# Patient Record
Sex: MALE | Race: BLACK OR AFRICAN AMERICAN | Employment: UNEMPLOYED | URBAN - METROPOLITAN AREA
[De-identification: names, ages, dates, MRNs, and addresses within clinical notes are randomized per-mention and may not be internally consistent; named-entity substitution may affect disease eponyms.]

---

## 2021-05-06 ENCOUNTER — HOSPITAL ENCOUNTER (EMERGENCY)
Age: 21
Discharge: HOME | End: 2021-05-06
Attending: EMERGENCY MEDICINE
Payer: COMMERCIAL

## 2021-05-06 ENCOUNTER — APPOINTMENT (OUTPATIENT)
Dept: GENERAL RADIOLOGY | Age: 21
End: 2021-05-06
Payer: COMMERCIAL

## 2021-05-06 ENCOUNTER — APPOINTMENT (OUTPATIENT)
Dept: CT IMAGING | Age: 21
End: 2021-05-06
Payer: COMMERCIAL

## 2021-05-06 VITALS
OXYGEN SATURATION: 99 % | WEIGHT: 280 LBS | RESPIRATION RATE: 26 BRPM | TEMPERATURE: 98.2 F | HEART RATE: 82 BPM | SYSTOLIC BLOOD PRESSURE: 128 MMHG | DIASTOLIC BLOOD PRESSURE: 72 MMHG

## 2021-05-06 DIAGNOSIS — R94.31 EKG ABNORMALITIES: ICD-10-CM

## 2021-05-06 DIAGNOSIS — I51.7 CARDIOMEGALY: ICD-10-CM

## 2021-05-06 DIAGNOSIS — R06.09 DOE (DYSPNEA ON EXERTION): Primary | ICD-10-CM

## 2021-05-06 LAB
ABO/RH: NORMAL
ACETAMINOPHEN LEVEL: <5 MCG/ML (ref 10–30)
ALBUMIN SERPL-MCNC: 4.4 G/DL (ref 3.5–5.2)
ALP BLD-CCNC: 84 U/L (ref 40–129)
ALT SERPL-CCNC: 21 U/L (ref 0–40)
ANION GAP SERPL CALCULATED.3IONS-SCNC: 10 MMOL/L (ref 7–16)
ANTIBODY SCREEN: NORMAL
AST SERPL-CCNC: 16 U/L (ref 0–39)
BILIRUB SERPL-MCNC: 0.3 MG/DL (ref 0–1.2)
BUN BLDV-MCNC: 10 MG/DL (ref 6–20)
CALCIUM SERPL-MCNC: 9.5 MG/DL (ref 8.6–10.2)
CHLORIDE BLD-SCNC: 106 MMOL/L (ref 98–107)
CK MB: 2.1 NG/ML (ref 0–7.7)
CO2: 25 MMOL/L (ref 22–29)
CREAT SERPL-MCNC: 0.7 MG/DL (ref 0.7–1.2)
D DIMER: <200 NG/ML DDU
ETHANOL: <10 MG/DL (ref 0–0.08)
GFR AFRICAN AMERICAN: >60
GFR NON-AFRICAN AMERICAN: >60 ML/MIN/1.73
GLUCOSE BLD-MCNC: 104 MG/DL (ref 74–99)
HCT VFR BLD CALC: 45.2 % (ref 37–54)
HEMOGLOBIN: 15.1 G/DL (ref 12.5–16.5)
MCH RBC QN AUTO: 30.5 PG (ref 26–35)
MCHC RBC AUTO-ENTMCNC: 33.4 % (ref 32–34.5)
MCV RBC AUTO: 91.3 FL (ref 80–99.9)
PDW BLD-RTO: 11.5 FL (ref 11.5–15)
PLATELET # BLD: 246 E9/L (ref 130–450)
PMV BLD AUTO: 10.1 FL (ref 7–12)
POTASSIUM SERPL-SCNC: 3.8 MMOL/L (ref 3.5–5)
PRO-BNP: 9 PG/ML (ref 0–125)
RBC # BLD: 4.95 E12/L (ref 3.8–5.8)
SALICYLATE, SERUM: <0.3 MG/DL (ref 0–30)
SARS-COV-2, NAAT: NOT DETECTED
SODIUM BLD-SCNC: 141 MMOL/L (ref 132–146)
TOTAL CK: 154 U/L (ref 20–200)
TOTAL PROTEIN: 7.1 G/DL (ref 6.4–8.3)
TRICYCLIC ANTIDEPRESSANTS SCREEN SERUM: NEGATIVE NG/ML
TROPONIN: <0.01 NG/ML (ref 0–0.03)
WBC # BLD: 4.6 E9/L (ref 4.5–11.5)

## 2021-05-06 PROCEDURE — 6370000000 HC RX 637 (ALT 250 FOR IP): Performed by: EMERGENCY MEDICINE

## 2021-05-06 PROCEDURE — 99284 EMERGENCY DEPT VISIT MOD MDM: CPT

## 2021-05-06 PROCEDURE — 80143 DRUG ASSAY ACETAMINOPHEN: CPT

## 2021-05-06 PROCEDURE — 36415 COLL VENOUS BLD VENIPUNCTURE: CPT

## 2021-05-06 PROCEDURE — 71045 X-RAY EXAM CHEST 1 VIEW: CPT

## 2021-05-06 PROCEDURE — 2580000003 HC RX 258: Performed by: RADIOLOGY

## 2021-05-06 PROCEDURE — 85027 COMPLETE CBC AUTOMATED: CPT

## 2021-05-06 PROCEDURE — 87635 SARS-COV-2 COVID-19 AMP PRB: CPT

## 2021-05-06 PROCEDURE — 6360000004 HC RX CONTRAST MEDICATION: Performed by: RADIOLOGY

## 2021-05-06 PROCEDURE — 80307 DRUG TEST PRSMV CHEM ANLYZR: CPT

## 2021-05-06 PROCEDURE — 80053 COMPREHEN METABOLIC PANEL: CPT

## 2021-05-06 PROCEDURE — 80179 DRUG ASSAY SALICYLATE: CPT

## 2021-05-06 PROCEDURE — 84484 ASSAY OF TROPONIN QUANT: CPT

## 2021-05-06 PROCEDURE — 71275 CT ANGIOGRAPHY CHEST: CPT

## 2021-05-06 PROCEDURE — 86850 RBC ANTIBODY SCREEN: CPT

## 2021-05-06 PROCEDURE — 82550 ASSAY OF CK (CPK): CPT

## 2021-05-06 PROCEDURE — 85378 FIBRIN DEGRADE SEMIQUANT: CPT

## 2021-05-06 PROCEDURE — 86901 BLOOD TYPING SEROLOGIC RH(D): CPT

## 2021-05-06 PROCEDURE — 83880 ASSAY OF NATRIURETIC PEPTIDE: CPT

## 2021-05-06 PROCEDURE — 93005 ELECTROCARDIOGRAM TRACING: CPT | Performed by: NURSE PRACTITIONER

## 2021-05-06 PROCEDURE — 86900 BLOOD TYPING SEROLOGIC ABO: CPT

## 2021-05-06 PROCEDURE — 82553 CREATINE MB FRACTION: CPT

## 2021-05-06 PROCEDURE — 82077 ASSAY SPEC XCP UR&BREATH IA: CPT

## 2021-05-06 RX ORDER — ASPIRIN 81 MG/1
324 TABLET, CHEWABLE ORAL ONCE
Status: COMPLETED | OUTPATIENT
Start: 2021-05-06 | End: 2021-05-06

## 2021-05-06 RX ORDER — SODIUM CHLORIDE 0.9 % (FLUSH) 0.9 %
10 SYRINGE (ML) INJECTION
Status: COMPLETED | OUTPATIENT
Start: 2021-05-06 | End: 2021-05-06

## 2021-05-06 RX ADMIN — Medication 10 ML: at 18:01

## 2021-05-06 RX ADMIN — IOPAMIDOL 60 ML: 755 INJECTION, SOLUTION INTRAVENOUS at 18:00

## 2021-05-06 RX ADMIN — ASPIRIN 324 MG: 81 TABLET, CHEWABLE ORAL at 15:11

## 2021-05-06 ASSESSMENT — ENCOUNTER SYMPTOMS
VOMITING: 0
SORE THROAT: 0
RHINORRHEA: 0
NAUSEA: 0
COUGH: 1
ABDOMINAL PAIN: 0
SHORTNESS OF BREATH: 1
DIARRHEA: 0
BACK PAIN: 0

## 2021-05-06 NOTE — ED PROVIDER NOTES
[facility]  Department of Emergency Medicine     Written by: Jimbo Dominguez DO  Patient Name: Thompson Councilman  Attending Provider: No att. providers found  Admit Date: 2021  2:47 PM  MRN: [id number]                   : 2000        Chief Complaint   Patient presents with    Other     went for a check up becasue he has a murmur, they did an xr and has fluid around heart. dr told pt he might have had a heart attack. pt gabriela states hes had a cough and sob for months     - Chief complaint    HPI     Patient is a 67-year-old male with reported past medical history of LVH and childhood heart murmur who presents the ED from PCPs office due to concerning EKG changes. Patient states he made an outpatient ointment today because he has a history of heart murmur; apparently an outpatient chest x-ray today showed possible fluid around his heart. Patient only endorses symptoms of cough and shortness of breath for a few months; he adamantly denies any chest pain at this time. Complaint overall is moderate to severe in severity, no specific aggravating or alleviating factors, and began sometime prior to arrival. Denies any fevers, neck pain or stiffness, sore throat, chest pain or palpitations, abdominal pain, nausea or vomiting, diarrhea, black or bloody stools, urinary symptoms, numbness or tingling anywhere, lower extremity edema or tenderness. Review of Systems   Constitutional: Negative for chills and fever. HENT: Negative for rhinorrhea and sore throat. Eyes: Negative for visual disturbance. Respiratory: Positive for cough and shortness of breath. Cardiovascular: Negative for chest pain and palpitations. Gastrointestinal: Negative for abdominal pain, diarrhea, nausea and vomiting. Genitourinary: Negative for dysuria and frequency. Musculoskeletal: Negative for back pain, neck pain and neck stiffness. Skin: Negative for rash and wound.    Neurological: Negative for dizziness and headaches. Psychiatric/Behavioral: Negative for confusion. All other systems reviewed and are negative. Physical Exam  Vitals signs and nursing note reviewed. Constitutional:       General: He is not in acute distress. Appearance: He is obese. HENT:      Head: Normocephalic and atraumatic. Right Ear: External ear normal.      Left Ear: External ear normal.      Nose: Nose normal. No rhinorrhea. Mouth/Throat:      Mouth: Mucous membranes are moist.      Pharynx: Oropharynx is clear. Eyes:      Extraocular Movements: Extraocular movements intact. Conjunctiva/sclera: Conjunctivae normal.      Pupils: Pupils are equal, round, and reactive to light. Neck:      Musculoskeletal: Normal range of motion and neck supple. No neck rigidity or muscular tenderness. Cardiovascular:      Rate and Rhythm: Normal rate and regular rhythm. Pulses: Normal pulses. Heart sounds: Murmur present. Pulmonary:      Effort: Pulmonary effort is normal. No respiratory distress. Breath sounds: Normal breath sounds. No wheezing or rales. Abdominal:      General: Bowel sounds are normal.      Palpations: Abdomen is soft. Tenderness: There is no abdominal tenderness. There is no guarding. Musculoskeletal: Normal range of motion. General: No tenderness. Right lower leg: No edema. Left lower leg: No edema. Skin:     General: Skin is warm and dry. Capillary Refill: Capillary refill takes less than 2 seconds. Coloration: Skin is not jaundiced or pale. Findings: No bruising, erythema or rash. Neurological:      General: No focal deficit present. Mental Status: He is alert and oriented to person, place, and time. Psychiatric:         Mood and Affect: Mood normal.         Behavior: Behavior normal.                Procedures       MDM     25-year-old male presents due to concerning outpatient CXR and EKG changes.   Vitals stable on arrival, patient AAOx4 in no acute distress. Denies any chest pain. EKG showing ST elevations in I and aVL, as well as depressions in III and aVF; after discussion with ED attending and interventional cardiologist these changes are thought more likely to be combination of benign early repolarization and patient's left ventricular hypertrophy. Patient remained asymptomatic throughout this encounter. Bedside cardiac ultrasound did not appear to show evidence for any pericardial effusion; CTA chest shows no evidence of pulmonary embolism or acute pulmonary abnormality, does show cardiomegaly; heart and pericardium demonstrate no acute abnormality. Labs reviewed and generally unremarkable; specifically, D-dimer and troponin are negative; rapid Covid is also negative. Given these findings and patient's lack of symptoms, feel patient is appropriate for discharge with instructions for outpatient follow-up with [address] [place] [address] cardiology group as well as his PCP; discussed results and findings with the patient and his mother who is at bedside, they voiced understanding and their questions were answered. Return precautions were discussed. I have discussed this patient with my attending, who has seen the patient and agrees with this disposition. Patient was seen and evaluated by myself and my attending Alyson Izquierdo DO. Assessment and Plan discussed with attending provider, please see attestation for final plan of care. ED Course as of May 07 0043   Thu May 06, 2021   6465 3:05 PM - Spoke with Dr. Jacky Guerrier (Cardiology). Discussed case. They reviewed EKG. Patient currently chest pain-free. No Cath Lab activation at this time will continue work-up    [ME]   1606 Patient reassessed, he continues to remain chest pain free at this time.     [VG]   2066 IMPRESSION:  No evidence of pulmonary embolism or acute pulmonary abnormality.     Cardiomegaly.      CTA CHEST W CONTRAST [VG]   0998 Patient reassessed; he remains asymptomatic; he was updated on results and plan for discharge, he is amenable and his questions were answered. States he will follow up outpatient with both his PCP and cardiology referral.  Return precautions discussed. [VG]      ED Course User Index  [ME] Jas Jaymiechirag   [VG] Kiran Lowry DO       --------------------------------------------- PAST HISTORY ---------------------------------------------  Past Medical History:  has a past medical history of Heart defect. Past Surgical History:  has no past surgical history on file. Social History:  reports that he has been smoking. He has never used smokeless tobacco. He reports current drug use. Drug: Marijuana. He reports that he does not drink alcohol. Family History: family history is not on file. The patients home medications have been reviewed. Allergies: Patient has no known allergies.     -------------------------------------------------- RESULTS -------------------------------------------------    LABS:  Results for orders placed or performed during the hospital encounter of 05/06/21   COVID-19, Rapid    Specimen: Nasopharyngeal Swab   Result Value Ref Range    SARS-CoV-2, NAAT Not Detected Not Detected   CBC   Result Value Ref Range    WBC 4.6 4.5 - 11.5 E9/L    RBC 4.95 3.80 - 5.80 E12/L    Hemoglobin 15.1 12.5 - 16.5 g/dL    Hematocrit 45.2 37.0 - 54.0 %    MCV 91.3 80.0 - 99.9 fL    MCH 30.5 26.0 - 35.0 pg    MCHC 33.4 32.0 - 34.5 %    RDW 11.5 11.5 - 15.0 fL    Platelets 468 404 - 469 E9/L    MPV 10.1 7.0 - 12.0 fL   Comprehensive Metabolic Panel   Result Value Ref Range    Sodium 141 132 - 146 mmol/L    Potassium 3.8 3.5 - 5.0 mmol/L    Chloride 106 98 - 107 mmol/L    CO2 25 22 - 29 mmol/L    Anion Gap 10 7 - 16 mmol/L    Glucose 104 (H) 74 - 99 mg/dL    BUN 10 6 - 20 mg/dL    CREATININE 0.7 0.7 - 1.2 mg/dL    GFR Non-African American >60 >=60 mL/min/1.73    GFR African American >60     Calcium 9.5 8.6 - 10.2 mg/dL Total Protein 7.1 6.4 - 8.3 g/dL    Albumin 4.4 3.5 - 5.2 g/dL    Total Bilirubin 0.3 0.0 - 1.2 mg/dL    Alkaline Phosphatase 84 40 - 129 U/L    ALT 21 0 - 40 U/L    AST 16 0 - 39 U/L   Troponin   Result Value Ref Range    Troponin <0.01 0.00 - 0.03 ng/mL   Brain Natriuretic Peptide   Result Value Ref Range    Pro-BNP 9 0 - 125 pg/mL   CK   Result Value Ref Range    Total  20 - 200 U/L   CK MB   Result Value Ref Range    CK-MB 2.1 0.0 - 7.7 ng/mL   D-Dimer, Quantitative   Result Value Ref Range    D-Dimer, Quant <200 ng/mL DDU   Serum Drug Screen   Result Value Ref Range    Ethanol Lvl <10 mg/dL    Acetaminophen Level <5.0 (L) 10.0 - 99.3 mcg/mL    Salicylate, Serum <8.7 0.0 - 30.0 mg/dL    TCA Scrn NEGATIVE Cutoff:300 ng/mL   EKG 12 Lead   Result Value Ref Range    Ventricular Rate 72 BPM    Atrial Rate 72 BPM    P-R Interval 152 ms    QRS Duration 94 ms    Q-T Interval 364 ms    QTc Calculation (Bazett) 398 ms    P Axis -8 degrees    R Axis 58 degrees    T Axis -19 degrees   TYPE AND SCREEN   Result Value Ref Range    ABO/Rh O NEG     Antibody Screen NEG        RADIOLOGY:  CTA CHEST W CONTRAST   Final Result   No evidence of pulmonary embolism or acute pulmonary abnormality. Cardiomegaly. XR CHEST PORTABLE   Final Result   No acute process. EKG:  This EKG is signed and interpreted by the emergency department physician. EKG: This EKG is signed and interpreted by the emergency department physician. Rate 72; rhythm is sinus; interpretation is NSR, normal axis,  ms, QRS 94 ms,  ms, what appeared to be ST elevation was in leads I and aVL and depressions in leads II and aVF with T wave inversions, thought more likely to be benign early repolarization with component of the left-ventricular hypertrophy, this case was reviewed with interventional cardiologist who agrees with this assessment; no previous EKG available for comparison.       ------------------------- NURSING NOTES AND VITALS REVIEWED ---------------------------  Date / Time Roomed:  5/6/2021  2:47 PM  ED Bed Assignment:  JAMI/JAMI    The nursing notes within the ED encounter and vital signs as below have been reviewed. Patient Vitals for the past 24 hrs:   BP Temp Pulse Resp SpO2 Weight   05/06/21 1613 128/72 -- 82 26 99 % --   05/06/21 1433 125/83 98.2 °F (36.8 °C) 84 18 97 % 280 lb (127 kg)   05/06/21 1430 -- 97.7 °F (36.5 °C) 76 16 95 % --       Oxygen Saturation Interpretation: Normal    ------------------------------------------ PROGRESS NOTES ------------------------------------------  Re-evaluation(s):  Please see ED course    Counseling:  I have spoken with the patient and mother and discussed todays results, in addition to providing specific details for the plan of care and counseling regarding the diagnosis and prognosis. Their questions are answered at this time and they are agreeable with the plan of admission.    --------------------------------- ADDITIONAL PROVIDER NOTES ---------------------------------  Consultations:  Please see ED course    This patient's ED course included: a personal history and physicial examination, re-evaluation prior to disposition, multiple bedside re-evaluations, IV medications, cardiac monitoring, continuous pulse oximetry and complex medical decision making and emergency management    This patient has remained hemodynamically stable during their ED course. Diagnosis:  1. SIMMONS (dyspnea on exertion)    2. EKG abnormalities    3. Cardiomegaly        Disposition:  Patient's disposition: D/C home   Patient's condition is Stable . Windy Councilman, DO Resident  05/07/21 2223      ATTENDING PROVIDER ATTESTATION:     Arabella Alexis presented to the emergency department for evaluation of Other (went for a check up becasue he has a murmur, they did an xr and has fluid around heart.  told pt he might have had a heart attack.   pt gabriela states hes had a cough and

## 2021-05-06 NOTE — ED NOTES
Time Heart Alert called:1447    Cardiology paged:  26    Cardiology call back:  0012    Patient to Cath Lab:      Hitesh Or  05/06/21 4333

## 2021-05-07 LAB
EKG ATRIAL RATE: 72 BPM
EKG P AXIS: -8 DEGREES
EKG P-R INTERVAL: 152 MS
EKG Q-T INTERVAL: 364 MS
EKG QRS DURATION: 94 MS
EKG QTC CALCULATION (BAZETT): 398 MS
EKG R AXIS: 58 DEGREES
EKG T AXIS: -19 DEGREES
EKG VENTRICULAR RATE: 72 BPM

## 2021-05-07 PROCEDURE — 93010 ELECTROCARDIOGRAM REPORT: CPT | Performed by: INTERNAL MEDICINE

## 2021-11-05 ENCOUNTER — HOSPITAL ENCOUNTER (EMERGENCY)
Age: 21
Discharge: HOME | End: 2021-11-05
Payer: MEDICAID

## 2021-11-05 VITALS
SYSTOLIC BLOOD PRESSURE: 132 MMHG | HEART RATE: 93 BPM | RESPIRATION RATE: 18 BRPM | OXYGEN SATURATION: 99 % | TEMPERATURE: 98.5 F | DIASTOLIC BLOOD PRESSURE: 91 MMHG

## 2021-11-05 DIAGNOSIS — J02.9 ACUTE PHARYNGITIS, UNSPECIFIED ETIOLOGY: Primary | ICD-10-CM

## 2021-11-05 LAB — STREP GRP A PCR: NEGATIVE

## 2021-11-05 PROCEDURE — 99283 EMERGENCY DEPT VISIT LOW MDM: CPT

## 2021-11-05 PROCEDURE — U0005 INFEC AGEN DETEC AMPLI PROBE: HCPCS

## 2021-11-05 PROCEDURE — 87880 STREP A ASSAY W/OPTIC: CPT

## 2021-11-05 PROCEDURE — 6370000000 HC RX 637 (ALT 250 FOR IP): Performed by: PHYSICIAN ASSISTANT

## 2021-11-05 PROCEDURE — U0003 INFECTIOUS AGENT DETECTION BY NUCLEIC ACID (DNA OR RNA); SEVERE ACUTE RESPIRATORY SYNDROME CORONAVIRUS 2 (SARS-COV-2) (CORONAVIRUS DISEASE [COVID-19]), AMPLIFIED PROBE TECHNIQUE, MAKING USE OF HIGH THROUGHPUT TECHNOLOGIES AS DESCRIBED BY CMS-2020-01-R: HCPCS

## 2021-11-05 RX ORDER — NAPROXEN 500 MG/1
500 TABLET ORAL 2 TIMES DAILY
Qty: 14 TABLET | Refills: 0 | OUTPATIENT
Start: 2021-11-05 | End: 2021-11-24

## 2021-11-05 RX ORDER — IBUPROFEN 400 MG/1
400 TABLET ORAL ONCE
Status: COMPLETED | OUTPATIENT
Start: 2021-11-05 | End: 2021-11-05

## 2021-11-05 ASSESSMENT — PAIN DESCRIPTION - PAIN TYPE: TYPE: ACUTE PAIN

## 2021-11-05 ASSESSMENT — PAIN SCALES - GENERAL
PAINLEVEL_OUTOF10: 9
PAINLEVEL_OUTOF10: 6

## 2021-11-05 ASSESSMENT — PAIN DESCRIPTION - DESCRIPTORS: DESCRIPTORS: SHARP;DISCOMFORT;ACHING

## 2021-11-05 ASSESSMENT — PAIN DESCRIPTION - LOCATION: LOCATION: THROAT

## 2021-11-05 NOTE — ED NOTES
RR=/nonlabored. Able to swallow without event. Lungs clear. Pink w/d. Labs sent.        Cristian Alas RN  11/05/21 0566

## 2021-11-06 LAB — SARS-COV-2, PCR: NOT DETECTED

## 2021-11-06 NOTE — ED PROVIDER NOTES
[facility]  Department of Emergency Medicine   ED  Encounter Note  Admit Date/RoomTime: 2021  6:13 PM  ED Room: [place] [address]    NAME: Lidia Hill  : 2000  MRN: [id number]     Chief Complaint:  Pharyngitis (pt with tonsils swelling since this morning, recent travel)    History of Present Illness       Lidia Hill is a 24 y.o. old male who presents to the emergency department by private vehicle, for persistent sore throat starting this morning. Patient states he also has a nonproductive cough. Patient recently traveled to [place]. Patient states his symptoms are mild in severity described as an aching pain. Patient states the pain is worse when he swallows. Patient denies anything making it better. Patient denies known sick contacts. Denies fever/chills, headache, vision change, dizziness, loss of taste or smell, difficulty swallowing, chest pain, dyspnea, abdominal pain, NVD, urinary symptoms, hematuria, numbness/weakness, rash. ROS   Pertinent positives and negatives are stated within HPI, all other systems reviewed and are negative. Past Medical History:  has a past medical history of Heart defect. Surgical History:  has no past surgical history on file. Social History:  reports that he has been smoking cigarettes. He has never used smokeless tobacco. He reports current drug use. Drug: Marijuana Armando Mustard). He reports that he does not drink alcohol. Family History: family history is not on file. Allergies: Patient has no known allergies. Physical Exam   Oxygen Saturation Interpretation: Normal on room air analysis. ED Triage Vitals   BP Temp Temp Source Pulse Resp SpO2 Height Weight   21 1809 21 1741 21 1741 21 1741 21 1809 21 174 -- --   (!) 132/91 98.2 °F (36.8 °C) Oral 96 18 96 %           · Constitutional:  Alert, development consistent with age.   · Ears:  External Ears: Bilateral normal.               TM's & External Canals: normal TM's and external ear canals bilaterally. · Nose:   There is no discharge, swelling or lesions noted. · Sinuses: no Bilateral maxillary sinus tenderness. no Bilateral frontal sinus tenderness. · Mouth:  normal tongue and buccal mucosa. · Throat: mild erythema. Airway Patent. No exudates. · Neck:  Supple. No meningeal signs. There is Bilateral  anterior cervical node tenderness. · Respiratory:   Breath sounds: Bilateral normal.  Lung sounds: normal.   · CV:  Regular rate and rhythm, normal heart sounds, without pathological murmurs, ectopy, gallops, or rubs. · GI:  Abdomen Soft, nontender, good bowel sounds. No firm or pulsatile mass. · Integument:  Normal turgor. Warm, dry, without visible rash. · Neurological:  Oriented. Motor functions intact. Lab / Imaging Results   (All laboratory and radiology results have been personally reviewed by myself)  Labs:  Results for orders placed or performed during the hospital encounter of 11/05/21   Strep Screen Group A Throat    Specimen: Throat   Result Value Ref Range    Strep Grp A PCR Negative Negative       Imaging: All Radiology results interpreted by Radiologist unless otherwise noted. No orders to display       ED Course / Medical Decision Making     Medications   ibuprofen (ADVIL;MOTRIN) tablet 400 mg (400 mg Oral Given 11/5/21 1182)       Consults:   None    Procedures:   none    Medical Decision Making: Patient presenting with sore throat. Patient is in no acute distress, afebrile, nontoxic appearance. Patient strep is negative. Patient received to send out Covid. Discussed part of care with patient. Recommend patient follow-up with PCP. Recommend patient return to the ED with new or worsening of symptoms. Assessment     1. Acute pharyngitis, unspecified etiology      Plan   Discharged home.   Patient condition is stable    New Medications     Discharge Medication List as of 11/5/2021  7:59 PM      START taking these medications    Details   Magic Mouthwash (MIRACLE MOUTHWASH) Swish and spit 5 mLs 4 times daily as needed for Irritation Preparation: 10cc maalox,  10cc Benadryl, 10cc Viscous Lidocaine, Disp-240 mL, R-0Print      naproxen (NAPROSYN) 500 MG tablet Take 1 tablet by mouth 2 times daily for 7 days, Disp-14 tablet, R-0Print           Electronically signed by TATIANA Parr   DD: 11/6/21  **This report was transcribed using voice recognition software. Every effort was made to ensure accuracy; however, inadvertent computerized transcription errors may be present.   END OF ED PROVIDER NOTE       TATIANA Parr  11/06/21 0034

## 2021-11-24 ENCOUNTER — APPOINTMENT (OUTPATIENT)
Dept: CT IMAGING | Age: 21
End: 2021-11-24
Payer: MEDICAID

## 2021-11-24 ENCOUNTER — HOSPITAL ENCOUNTER (EMERGENCY)
Age: 21
Discharge: HOME | End: 2021-11-24
Payer: MEDICAID

## 2021-11-24 VITALS
SYSTOLIC BLOOD PRESSURE: 152 MMHG | RESPIRATION RATE: 18 BRPM | HEART RATE: 99 BPM | TEMPERATURE: 98 F | OXYGEN SATURATION: 96 % | DIASTOLIC BLOOD PRESSURE: 92 MMHG

## 2021-11-24 DIAGNOSIS — S01.81XA FACIAL LACERATION, INITIAL ENCOUNTER: ICD-10-CM

## 2021-11-24 DIAGNOSIS — S02.40DB OPEN FRACTURE OF LEFT SIDE OF MAXILLA, INITIAL ENCOUNTER (HCC): Primary | ICD-10-CM

## 2021-11-24 PROCEDURE — 12011 RPR F/E/E/N/L/M 2.5 CM/<: CPT

## 2021-11-24 PROCEDURE — 70486 CT MAXILLOFACIAL W/O DYE: CPT

## 2021-11-24 PROCEDURE — 90471 IMMUNIZATION ADMIN: CPT | Performed by: PHYSICIAN ASSISTANT

## 2021-11-24 PROCEDURE — 6370000000 HC RX 637 (ALT 250 FOR IP): Performed by: PHYSICIAN ASSISTANT

## 2021-11-24 PROCEDURE — 90714 TD VACC NO PRESV 7 YRS+ IM: CPT | Performed by: PHYSICIAN ASSISTANT

## 2021-11-24 PROCEDURE — 99283 EMERGENCY DEPT VISIT LOW MDM: CPT

## 2021-11-24 PROCEDURE — 96372 THER/PROPH/DIAG INJ SC/IM: CPT

## 2021-11-24 PROCEDURE — 6360000002 HC RX W HCPCS: Performed by: PHYSICIAN ASSISTANT

## 2021-11-24 PROCEDURE — 70450 CT HEAD/BRAIN W/O DYE: CPT

## 2021-11-24 RX ORDER — AMOXICILLIN AND CLAVULANATE POTASSIUM 875; 125 MG/1; MG/1
1 TABLET, FILM COATED ORAL ONCE
Status: COMPLETED | OUTPATIENT
Start: 2021-11-24 | End: 2021-11-24

## 2021-11-24 RX ORDER — TETANUS AND DIPHTHERIA TOXOIDS ADSORBED 2; 2 [LF]/.5ML; [LF]/.5ML
0.5 INJECTION INTRAMUSCULAR ONCE
Status: COMPLETED | OUTPATIENT
Start: 2021-11-24 | End: 2021-11-24

## 2021-11-24 RX ORDER — HYDROCODONE BITARTRATE AND ACETAMINOPHEN 5; 325 MG/1; MG/1
1 TABLET ORAL EVERY 6 HOURS PRN
Qty: 12 TABLET | Refills: 0 | Status: SHIPPED | OUTPATIENT
Start: 2021-11-24 | End: 2021-11-27

## 2021-11-24 RX ORDER — AMOXICILLIN AND CLAVULANATE POTASSIUM 875; 125 MG/1; MG/1
1 TABLET, FILM COATED ORAL 2 TIMES DAILY
Qty: 14 TABLET | Refills: 0 | Status: SHIPPED | OUTPATIENT
Start: 2021-11-24 | End: 2021-12-01

## 2021-11-24 RX ADMIN — Medication: at 02:59

## 2021-11-24 RX ADMIN — AMOXICILLIN AND CLAVULANATE POTASSIUM 1 TABLET: 875; 125 TABLET, FILM COATED ORAL at 04:49

## 2021-11-24 RX ADMIN — TETANUS AND DIPHTHERIA TOXOIDS ADSORBED 0.5 ML: 2; 2 INJECTION INTRAMUSCULAR at 02:57

## 2021-11-24 NOTE — Clinical Note
Moose Paredes was seen and treated in our emergency department on 11/24/2021. He may return to work on 11/25/2021. If you have any questions or concerns, please don't hesitate to call.       Tracy Bennett PA-C
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
## 2021-11-24 NOTE — Clinical Note
Korin Yoon was seen and treated in our emergency department on 11/24/2021. He may return to work on 11/25/2021. If you have any questions or concerns, please don't hesitate to call.       Sean York PA-C